# Patient Record
(demographics unavailable — no encounter records)

---

## 2025-02-05 NOTE — HISTORY OF PRESENT ILLNESS
[de-identified] : ERLINDA HOPE is a 32 year old male  presents for an annual physical. 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

## 2025-02-05 NOTE — ASSESSMENT
[FreeTextEntry1] : Blood work was reviewed  Low salt diet Elevate legs when possible Vascular evaluation enouraged  Weight loss encouraged / Better diet  Routine dental / optho f/u  Preventative counselling provided  Continue Vitamin D3 2000 IU/Day  Annual CC

## 2025-02-05 NOTE — REVIEW OF SYSTEMS
[Lower Ext Edema] : lower extremity edema [Diarrhea] : diarrhea [Heartburn] : heartburn [Melena] : no melena [Frequency] : frequency [Negative] : Heme/Lymph

## 2025-02-05 NOTE — HEALTH RISK ASSESSMENT
[Very Good] : ~his/her~  mood as very good [No] : In the past 12 months have you used drugs other than those required for medical reasons? No [No falls in past year] : Patient reported no falls in the past year [0] : 2) Feeling down, depressed, or hopeless: Not at all (0) [PHQ-2 Negative - No further assessment needed] : PHQ-2 Negative - No further assessment needed [[id number]] : 0 [Never] : Never [HIV test declined] : HIV test declined [Hepatitis C test declined] : Hepatitis C test declined [Change in mental status noted] : No change in mental status noted [None] : None [With Family] : lives with family [Employed] : employed [Single] : single [Sexually Active] : not sexually active [Fully functional (bathing, dressing, toileting, transferring, walking, feeding)] : Fully functional (bathing, dressing, toileting, transferring, walking, feeding) [Fully functional (using the telephone, shopping, preparing meals, housekeeping, doing laundry, using] : Fully functional and needs no help or supervision to perform IADLs (using the telephone, shopping, preparing meals, housekeeping, doing laundry, using transportation, managing medications and managing finances) [Reports changes in hearing] : Reports no changes in hearing [Reports changes in vision] : Reports no changes in vision [Reports changes in dental health] : Reports no changes in dental health [de-identified] : slow, unsteady gait

## 2025-02-05 NOTE — PHYSICAL EXAM
[Well Nourished] : well nourished [Well Developed] : well developed [Well-Appearing] : well-appearing [Normal Sclera/Conjunctiva] : normal sclera/conjunctiva [PERRL] : pupils equal round and reactive to light [EOMI] : extraocular movements intact [Normal Outer Ear/Nose] : the outer ears and nose were normal in appearance [Normal Oropharynx] : the oropharynx was normal [No JVD] : no jugular venous distention [No Lymphadenopathy] : no lymphadenopathy [Supple] : supple [Thyroid Normal, No Nodules] : the thyroid was normal and there were no nodules present [No Respiratory Distress] : no respiratory distress  [No Accessory Muscle Use] : no accessory muscle use [Clear to Auscultation] : lungs were clear to auscultation bilaterally [Normal Rate] : normal rate  [Regular Rhythm] : with a regular rhythm [Normal S1, S2] : normal S1 and S2 [No Murmur] : no murmur heard [No Carotid Bruits] : no carotid bruits [No Abdominal Bruit] : a ~M bruit was not heard ~T in the abdomen [No Varicosities] : no varicosities [Pedal Pulses Present] : the pedal pulses are present [No Palpable Aorta] : no palpable aorta [No Extremity Clubbing/Cyanosis] : no extremity clubbing/cyanosis [Soft] : abdomen soft [Non Tender] : non-tender [Non-distended] : non-distended [No Masses] : no abdominal mass palpated [No HSM] : no HSM [Normal Bowel Sounds] : normal bowel sounds [Normal Posterior Cervical Nodes] : no posterior cervical lymphadenopathy [Normal Anterior Cervical Nodes] : no anterior cervical lymphadenopathy [No CVA Tenderness] : no CVA  tenderness [No Spinal Tenderness] : no spinal tenderness [No Joint Swelling] : no joint swelling [Grossly Normal Strength/Tone] : grossly normal strength/tone [No Rash] : no rash [Coordination Grossly Intact] : coordination grossly intact [No Focal Deficits] : no focal deficits [Normal Gait] : normal gait [Deep Tendon Reflexes (DTR)] : deep tendon reflexes were 2+ and symmetric [Normal Affect] : the affect was normal [Normal Insight/Judgement] : insight and judgment were intact [de-identified] : 1-2+ b/l LE edema ankles [de-identified] : obese

## 2025-07-18 NOTE — ASSESSMENT
[FreeTextEntry1] : CBC/ CMP/ESR/ Doppler results reviewed on patients phone. Continue to keep feet clean Leg elevation / Compression stockings f/u PRN

## 2025-07-18 NOTE — PHYSICAL EXAM
[Normal] : affect was normal and insight and judgment were intact [de-identified] : mild right greater than left ankle edema [de-identified] : no signs of cellulitis

## 2025-07-18 NOTE — HISTORY OF PRESENT ILLNESS
[FreeTextEntry8] : Pt presents for evaluation of R leg cellulitis - went to [facility] ED - started on (home) IV antibiotics for three days and then switched to oral Augmentin. FInished Augmentin 5 days ago. No fever/chills no further leg redness. Still slightly swollen from baseline   WBC was initially 12K